# Patient Record
Sex: FEMALE | Race: BLACK OR AFRICAN AMERICAN | NOT HISPANIC OR LATINO | URBAN - METROPOLITAN AREA
[De-identification: names, ages, dates, MRNs, and addresses within clinical notes are randomized per-mention and may not be internally consistent; named-entity substitution may affect disease eponyms.]

---

## 2023-12-18 ENCOUNTER — OFFICE VISIT (OUTPATIENT)
Dept: PEDIATRICS | Facility: CLINIC | Age: 18
End: 2023-12-18
Payer: COMMERCIAL

## 2023-12-18 VITALS
SYSTOLIC BLOOD PRESSURE: 122 MMHG | BODY MASS INDEX: 34.48 KG/M2 | WEIGHT: 194.6 LBS | HEART RATE: 77 BPM | HEIGHT: 63 IN | DIASTOLIC BLOOD PRESSURE: 78 MMHG

## 2023-12-18 DIAGNOSIS — L21.9 SEBORRHEIC DERMATITIS: ICD-10-CM

## 2023-12-18 DIAGNOSIS — J45.20 MILD INTERMITTENT ASTHMA WITHOUT COMPLICATION (HHS-HCC): ICD-10-CM

## 2023-12-18 DIAGNOSIS — L85.3 DRY SKIN DERMATITIS: ICD-10-CM

## 2023-12-18 DIAGNOSIS — J30.2 SEASONAL ALLERGIC RHINITIS, UNSPECIFIED TRIGGER: ICD-10-CM

## 2023-12-18 DIAGNOSIS — Z00.129 ENCOUNTER FOR ROUTINE CHILD HEALTH EXAMINATION WITHOUT ABNORMAL FINDINGS: Primary | ICD-10-CM

## 2023-12-18 PROBLEM — J45.909 ASTHMA (HHS-HCC): Status: ACTIVE | Noted: 2023-12-18

## 2023-12-18 PROBLEM — F41.9 ANXIETY: Status: ACTIVE | Noted: 2023-12-18

## 2023-12-18 PROBLEM — H10.13 ALLERGIC CONJUNCTIVITIS OF BOTH EYES: Status: ACTIVE | Noted: 2023-12-18

## 2023-12-18 PROBLEM — E55.9 VITAMIN D DEFICIENCY: Status: ACTIVE | Noted: 2023-12-18

## 2023-12-18 PROBLEM — G43.909 MIGRAINE HEADACHE: Status: ACTIVE | Noted: 2023-12-18

## 2023-12-18 PROBLEM — N94.6 DYSMENORRHEA IN ADOLESCENT: Status: ACTIVE | Noted: 2023-12-18

## 2023-12-18 PROBLEM — L70.9 ACNE: Status: ACTIVE | Noted: 2019-08-15

## 2023-12-18 PROBLEM — J30.9 ALLERGIC RHINITIS: Status: ACTIVE | Noted: 2023-12-18

## 2023-12-18 PROCEDURE — 96127 BRIEF EMOTIONAL/BEHAV ASSMT: CPT | Performed by: PEDIATRICS

## 2023-12-18 PROCEDURE — 3008F BODY MASS INDEX DOCD: CPT | Performed by: PEDIATRICS

## 2023-12-18 PROCEDURE — 99395 PREV VISIT EST AGE 18-39: CPT | Performed by: PEDIATRICS

## 2023-12-18 RX ORDER — KETOCONAZOLE 20 MG/G
1 CREAM TOPICAL 2 TIMES DAILY
COMMUNITY
Start: 2019-07-31 | End: 2023-12-18 | Stop reason: SDUPTHER

## 2023-12-18 RX ORDER — ALBUTEROL SULFATE 90 UG/1
2 AEROSOL, METERED RESPIRATORY (INHALATION) EVERY 6 HOURS PRN
Qty: 18 G | Refills: 2 | Status: SHIPPED | OUTPATIENT
Start: 2023-12-18 | End: 2024-12-17

## 2023-12-18 RX ORDER — LORATADINE 10 MG/1
10 TABLET ORAL DAILY
COMMUNITY
End: 2023-12-18 | Stop reason: SDUPTHER

## 2023-12-18 RX ORDER — HYDROCORTISONE 25 MG/G
1 OINTMENT TOPICAL 2 TIMES DAILY
Qty: 28.35 G | Refills: 6 | Status: SHIPPED | OUTPATIENT
Start: 2023-12-18

## 2023-12-18 RX ORDER — CLINDAMYCIN PHOSPHATE 10 UG/ML
1 LOTION TOPICAL DAILY
COMMUNITY
Start: 2019-07-31

## 2023-12-18 RX ORDER — KETOCONAZOLE 20 MG/G
1 CREAM TOPICAL 2 TIMES DAILY
Qty: 60 G | Refills: 3 | Status: SHIPPED | OUTPATIENT
Start: 2023-12-18

## 2023-12-18 RX ORDER — HYDROCORTISONE 25 MG/G
1 OINTMENT TOPICAL 2 TIMES DAILY
COMMUNITY
Start: 2019-05-30 | End: 2023-12-18 | Stop reason: SDUPTHER

## 2023-12-18 RX ORDER — FLUTICASONE PROPIONATE 50 MCG
1 SPRAY, SUSPENSION (ML) NASAL DAILY
COMMUNITY
End: 2023-12-18 | Stop reason: SDUPTHER

## 2023-12-18 RX ORDER — ALBUTEROL SULFATE 0.83 MG/ML
2.5 SOLUTION RESPIRATORY (INHALATION) EVERY 4 HOURS PRN
Qty: 75 ML | Refills: 1 | Status: SHIPPED | OUTPATIENT
Start: 2023-12-18

## 2023-12-18 RX ORDER — ALBUTEROL SULFATE 0.83 MG/ML
2.5 SOLUTION RESPIRATORY (INHALATION) EVERY 4 HOURS PRN
COMMUNITY
End: 2023-12-18 | Stop reason: SDUPTHER

## 2023-12-18 RX ORDER — FLUTICASONE PROPIONATE 50 MCG
1 SPRAY, SUSPENSION (ML) NASAL DAILY
Qty: 16 G | Refills: 3 | Status: SHIPPED | OUTPATIENT
Start: 2023-12-18

## 2023-12-18 RX ORDER — KETOCONAZOLE 20 MG/ML
1 SHAMPOO, SUSPENSION TOPICAL 2 TIMES WEEKLY
COMMUNITY
End: 2023-12-18 | Stop reason: SDUPTHER

## 2023-12-18 RX ORDER — FLUTICASONE PROPIONATE 110 UG/1
2 AEROSOL, METERED RESPIRATORY (INHALATION)
COMMUNITY
Start: 2016-11-18 | End: 2023-12-18 | Stop reason: ALTCHOICE

## 2023-12-18 RX ORDER — LORATADINE 10 MG/1
10 TABLET ORAL DAILY
Qty: 30 TABLET | Refills: 11 | Status: SHIPPED | OUTPATIENT
Start: 2023-12-18

## 2023-12-18 RX ORDER — KETOCONAZOLE 20 MG/ML
1 SHAMPOO, SUSPENSION TOPICAL 2 TIMES WEEKLY
Qty: 120 ML | Refills: 6 | Status: SHIPPED | OUTPATIENT
Start: 2023-12-18

## 2023-12-18 RX ORDER — ALBUTEROL SULFATE 90 UG/1
1 AEROSOL, METERED RESPIRATORY (INHALATION) EVERY 4 HOURS PRN
COMMUNITY
Start: 2013-10-29 | End: 2023-12-18 | Stop reason: WASHOUT

## 2023-12-18 RX ORDER — ACETAMINOPHEN 500 MG
2000 TABLET ORAL DAILY
COMMUNITY

## 2023-12-18 NOTE — PROGRESS NOTES
"Subjective   Patient ID: Gayla Castellano is a 18 y.o. female who presents for Well Child (Here with self 18 yr old [facility]).  HPI      10+ year female checkup    Concerns: none        Diet and Nutrition: well balanced diet, appropriate Calcium intake - eats cheese and yogurt, most days of the week. All food groups. Normal appetite.   Sleep: No problems with sleep.   Elimination: normal bowel movement frequency, normal consistency, normal urine output  Dental: brushes teeth twice a day, established with dentist   Menstrual: has period about once a month, no abnormalities    School/Behavior:  ?  School/Behavior: [employer] - going well, political science major. Living there. Grades are good.   Exercise: gets regular exercise - dance and goes to gym with friends, physical activity level discussed and encouraged. [employer] , pretty involved in school  Social:   ? No Vaping, no smoking, no alcohol, no drugs  ? Not currently in relationship/dating. Not sexually active.   Depression screen: Denies problems with mood, normal depression screening          Visit Vitals  /78 (BP Location: Right arm, Patient Position: Sitting)   Pulse 77   Ht 1.593 m (5' 2.72\")   Wt 88.3 kg (194 lb 9.6 oz)   BMI 34.78 kg/m²   BSA 1.98 m²     Objective   Physical Exam  Vitals reviewed.   Constitutional:       General: She is not in acute distress.     Appearance: She is not toxic-appearing.   HENT:      Right Ear: Tympanic membrane and ear canal normal.      Left Ear: Tympanic membrane and ear canal normal.      Nose: Nose normal. No congestion or rhinorrhea.      Mouth/Throat:      Mouth: Mucous membranes are moist.      Pharynx: No oropharyngeal exudate or posterior oropharyngeal erythema.   Eyes:      General:         Right eye: No discharge.         Left eye: No discharge.      Extraocular Movements: Extraocular movements intact.      Pupils: Pupils are equal, round, and reactive to light.   Cardiovascular:      Rate and " Rhythm: Normal rate and regular rhythm.      Pulses: Normal pulses.      Heart sounds: Normal heart sounds. No murmur heard.  Pulmonary:      Effort: Pulmonary effort is normal.      Breath sounds: Normal breath sounds. No wheezing or rhonchi.   Abdominal:      Palpations: Abdomen is soft. There is no mass.      Tenderness: There is no abdominal tenderness.   Genitourinary:     Comments: Declines genital exam   Musculoskeletal:         General: No signs of injury.   Skin:     Findings: No rash.   Neurological:      Mental Status: She is alert.      Sensory: Sensation is intact.      Motor: Motor function is intact.      Gait: Gait is intact.   Psychiatric:         Mood and Affect: Mood normal.         NO - Family instructed to call __ days after going for test to obtain results  YES - OK for school and sports  NO - Family declined all or some vaccines  YES - All vaccines given at today's visit were reviewed with the family and patient. Risks/benefits/side effects discussed and VIS sheet provided. All questions answered. Given with consent    A/P:  Well child.  Depression Screen normal - PHQ-A score 7.  No hearing/vision   BMI reviewed - elevated, weight has increased from last year . Gets adequate exercise     Shots UTD, already received flu this year. Tdap next year     F/U:  1 year  Discussed all orders from visit and any results received today.    Assessment/Plan   {Assess/PlanSmartLinks:8101    1. Encounter for routine child health examination without abnormal findings    2. Mild intermittent asthma without complication    3. Seasonal allergic rhinitis, unspecified trigger    4. Seborrheic dermatitis    5. Dry skin dermatitis        Asthma - albuterol as needed. Not using flovent. Dance was trigger, URIs. No ER visits, albuterol helps     Allergies - meds help, fall is worst season     Refills for current medications     No problem-specific Assessment & Plan notes found for this encounter.      Problem List  Items Addressed This Visit       Allergic rhinitis    Relevant Medications    fluticasone (Flonase) 50 mcg/actuation nasal spray    loratadine (Claritin) 10 mg tablet    Seborrheic dermatitis    Relevant Medications    ketoconazole (NIZOral) 2 % shampoo    ketoconazole (NIZOral) 2 % cream    Asthma    Relevant Medications    albuterol 90 mcg/actuation inhaler    albuterol 2.5 mg /3 mL (0.083 %) nebulizer solution     Other Visit Diagnoses       Encounter for routine child health examination without abnormal findings    -  Primary    Dry skin dermatitis        Relevant Medications    hydrocortisone 2.5 % ointment

## 2023-12-29 RX ORDER — LORATADINE 10 MG/1
10 TABLET ORAL DAILY
Qty: 90 TABLET | OUTPATIENT
Start: 2023-12-29

## 2024-11-27 ENCOUNTER — APPOINTMENT (OUTPATIENT)
Dept: PRIMARY CARE | Facility: CLINIC | Age: 19
End: 2024-11-27
Payer: COMMERCIAL

## 2024-12-17 ENCOUNTER — APPOINTMENT (OUTPATIENT)
Dept: OBSTETRICS AND GYNECOLOGY | Facility: CLINIC | Age: 19
End: 2024-12-17
Payer: COMMERCIAL

## 2025-01-07 ENCOUNTER — APPOINTMENT (OUTPATIENT)
Dept: OBSTETRICS AND GYNECOLOGY | Facility: CLINIC | Age: 20
End: 2025-01-07
Payer: COMMERCIAL

## 2025-03-15 ENCOUNTER — OFFICE VISIT (OUTPATIENT)
Dept: URGENT CARE | Age: 20
End: 2025-03-15
Payer: COMMERCIAL

## 2025-03-15 VITALS
HEIGHT: 63 IN | WEIGHT: 169.97 LBS | SYSTOLIC BLOOD PRESSURE: 121 MMHG | TEMPERATURE: 97.9 F | OXYGEN SATURATION: 100 % | HEART RATE: 83 BPM | DIASTOLIC BLOOD PRESSURE: 72 MMHG | BODY MASS INDEX: 30.12 KG/M2

## 2025-03-15 DIAGNOSIS — B49 FUNGAL INFECTION: Primary | ICD-10-CM

## 2025-03-15 RX ORDER — KETOCONAZOLE 20 MG/G
CREAM TOPICAL 2 TIMES DAILY
Qty: 15 G | Refills: 0 | Status: SHIPPED | OUTPATIENT
Start: 2025-03-15 | End: 2025-03-29

## 2025-03-15 ASSESSMENT — ENCOUNTER SYMPTOMS
COLOR CHANGE: 1
LOSS OF SENSATION IN FEET: 0
DEPRESSION: 0
OCCASIONAL FEELINGS OF UNSTEADINESS: 0

## 2025-03-15 ASSESSMENT — PAIN SCALES - GENERAL: PAINLEVEL_OUTOF10: 0-NO PAIN

## 2025-03-15 NOTE — PATIENT INSTRUCTIONS
Ketoconazole cream please apply to area 2 times a day for 7 to 14 days.  If no improvement in 5 to 7 days, please reach out to dermatology.  Please call  referral line to schedule appointment.  Or  please call for a follow up appointment with:   [facility]  [address] [facility] [address]   [place] [address]  Phone: [phone]  Schedule appointment online: [facility]   Please call your primary care doctor next 5 to 7 days.  If worsening symptoms, please go local emergency department for further evaluation    Please follow up with your primary provider within one week if symptoms do not improve.  You may schedule an appointment online at [email or web address] or call [phone]. Go to the Emergency Department if symptoms significantly worsen or if you develop chest pain or shortness of breath.

## 2025-03-15 NOTE — PROGRESS NOTES
Subjective   Patient ID: Gayla Castellano is a 19 y.o. female. They present today with a chief complaint of fungle infection (Scalp/ neck infection x one week./Patient mentions having a hx of fungle infection to scaple. ).    History of Present Illness  Galya Castellano is a 19 y.o. female who presents to the clinic for 2 days of hyperpigmented patches of skin on the neck and the scalp.  Patient is brought in by patient's mother.  Patient mom states that they noticed 2 days ago.  Patient mom states patient is to have a history of fungal infection in the same area in the past.  Pt denies any chest pain, sob, N/V at this time in clinic.             Past Medical History  Allergies as of 03/15/2025    (No Known Allergies)       (Not in a hospital admission)       Past Medical History:   Diagnosis Date    Acute bronchitis, unspecified 03/03/2020    Acute purulent bronchitis    Acute bronchitis, unspecified 03/07/2018    Acute purulent bronchitis    Acute candidiasis of vulva and vagina 11/16/2016    Yeast vaginitis    Acute candidiasis of vulva and vagina 03/07/2018    Yeast vaginitis    COVID-19 03/10/2022    COVID-19 virus infection    Encounter for screening for diseases of the blood and blood-forming organs and certain disorders involving the immune mechanism 11/16/2016    Screening for iron deficiency anemia    Encounter for screening for lipoid disorders 11/16/2016    Screening for cholesterol level    Encounter for screening for other suspected endocrine disorder 11/16/2016    Screening for endocrine, nutritional, metabolic and immunity disorder    Moderate persistent asthma with (acute) exacerbation ([facility]) 03/07/2018    Moderate persistent asthma with acute exacerbation    Pain in left ankle and joints of left foot 11/16/2016    Acute left ankle pain    Pain in right ankle and joints of right foot 11/16/2016    Acute right ankle pain    Personal history of other diseases of the musculoskeletal system and  connective tissue 11/16/2016    History of low back pain    Personal history of other diseases of the respiratory system 06/27/2013    History of pharyngitis    Personal history of other diseases of the respiratory system 03/03/2020    History of acute sinusitis    Personal history of other diseases of the respiratory system     Personal history of asthma    Personal history of other diseases of the respiratory system 11/16/2016    History of acute sinusitis    Personal history of other diseases of the respiratory system 08/09/2018    History of asthma    Personal history of other diseases of the respiratory system 03/07/2018    History of acute pharyngitis    Personal history of other diseases of the respiratory system 03/07/2018    History of acute sinusitis    Personal history of other diseases of the respiratory system 01/21/2016    History of sinusitis    Personal history of other diseases of the respiratory system 01/21/2016    History of acute bronchitis    Personal history of other infectious and parasitic diseases 03/03/2020    History of candidiasis of vagina    Personal history of other specified conditions 11/16/2016    History of dysuria    Personal history of traumatic brain injury 03/08/2022    History of concussion    Pityriasis versicolor 01/03/2023    Tinea versicolor    Seborrhea capitis 10/20/2017    Dandruff       Past Surgical History:   Procedure Laterality Date    MR HEAD ANGIO WO IV CONTRAST  6/1/2020    MR HEAD ANGIO WO IV CONTRAST 6/1/2020 [facility]    OTHER SURGICAL HISTORY  10/28/2019    No history of surgery        reports that she has never smoked. She has never used smokeless tobacco. She reports that she does not drink alcohol and does not use drugs.    Review of Systems  Review of Systems   Skin:  Positive for color change and rash.        Back of neck, scalp   All other systems reviewed and are negative.                                 Objective    Vitals:    03/15/25  "1935   BP: 121/72   Pulse: 83   Temp: 36.6 °C (97.9 °F)   SpO2: 100%   Weight: 77.1 kg (169 lb 15.6 oz)   Height: 1.6 m (5' 3\")     No LMP recorded.    Physical Exam  Constitutional:       Appearance: Normal appearance.   Skin:         Neurological:      General: No focal deficit present.      Mental Status: She is alert and oriented to person, place, and time. Mental status is at baseline.   Psychiatric:         Mood and Affect: Mood normal.         Behavior: Behavior normal.         Procedures    Point of Care Test & Imaging Results from this visit  No results found for this visit on 03/15/25.   No results found.    Diagnostic study results (if any) were reviewed by JOEY Young.    Assessment/Plan   Allergies, medications, history, and pertinent labs/EKGs/Imaging reviewed by JOEY Young.     Medical Decision Making  Ketoconazole cream to be used to the areas of hyperpigmented skin.  Patient describes the areas as itching.  Patient has had history of fungal infections area in the past.  Advised patient if no improvement in 5 days to reach out to dermatology for evaluation.  Patient and mom verbalized understanding.    Orders and Diagnoses  Diagnoses and all orders for this visit:  Fungal infection  -     ketoconazole (NIZOral) 2 % cream; Apply topically 2 times a day for 14 days.  -     Referral to Dermatology      Medical Admin Record      Patient disposition: Home    Electronically signed by JOEY Young  8:08 PM      "

## 2025-05-20 ENCOUNTER — APPOINTMENT (OUTPATIENT)
Dept: OBSTETRICS AND GYNECOLOGY | Facility: CLINIC | Age: 20
End: 2025-05-20
Payer: COMMERCIAL

## 2025-05-20 VITALS — WEIGHT: 223 LBS | SYSTOLIC BLOOD PRESSURE: 120 MMHG | BODY MASS INDEX: 39.5 KG/M2 | DIASTOLIC BLOOD PRESSURE: 70 MMHG

## 2025-05-20 DIAGNOSIS — N94.6 DYSMENORRHEA: ICD-10-CM

## 2025-05-20 DIAGNOSIS — Z01.419 ENCOUNTER FOR ANNUAL ROUTINE GYNECOLOGICAL EXAMINATION: Primary | ICD-10-CM

## 2025-05-20 DIAGNOSIS — R19.00 PELVIC FULLNESS IN FEMALE: ICD-10-CM

## 2025-05-20 PROCEDURE — 99395 PREV VISIT EST AGE 18-39: CPT | Performed by: OBSTETRICS & GYNECOLOGY

## 2025-05-20 PROCEDURE — 87591 N.GONORRHOEAE DNA AMP PROB: CPT

## 2025-05-20 PROCEDURE — 87491 CHLMYD TRACH DNA AMP PROBE: CPT

## 2025-05-20 SDOH — ECONOMIC STABILITY: FOOD INSECURITY: WITHIN THE PAST 12 MONTHS, THE FOOD YOU BOUGHT JUST DIDN'T LAST AND YOU DIDN'T HAVE MONEY TO GET MORE.: NEVER TRUE

## 2025-05-20 SDOH — ECONOMIC STABILITY: TRANSPORTATION INSECURITY
IN THE PAST 12 MONTHS, HAS LACK OF TRANSPORTATION KEPT YOU FROM MEETINGS, WORK, OR FROM GETTING THINGS NEEDED FOR DAILY LIVING?: NO

## 2025-05-20 SDOH — ECONOMIC STABILITY: FOOD INSECURITY: WITHIN THE PAST 12 MONTHS, YOU WORRIED THAT YOUR FOOD WOULD RUN OUT BEFORE YOU GOT MONEY TO BUY MORE.: NEVER TRUE

## 2025-05-20 SDOH — ECONOMIC STABILITY: TRANSPORTATION INSECURITY
IN THE PAST 12 MONTHS, HAS THE LACK OF TRANSPORTATION KEPT YOU FROM MEDICAL APPOINTMENTS OR FROM GETTING MEDICATIONS?: NO

## 2025-05-20 ASSESSMENT — PATIENT HEALTH QUESTIONNAIRE - PHQ9
1. LITTLE INTEREST OR PLEASURE IN DOING THINGS: NOT AT ALL
SUM OF ALL RESPONSES TO PHQ9 QUESTIONS 1 & 2: 0
2. FEELING DOWN, DEPRESSED OR HOPELESS: NOT AT ALL

## 2025-05-20 ASSESSMENT — LIFESTYLE VARIABLES: HOW OFTEN DO YOU HAVE A DRINK CONTAINING ALCOHOL: NEVER

## 2025-05-20 ASSESSMENT — PAIN SCALES - GENERAL: PAINLEVEL_OUTOF10: 0-NO PAIN

## 2025-05-21 LAB
C TRACH RRNA SPEC QL NAA+PROBE: NEGATIVE
N GONORRHOEA DNA SPEC QL PROBE+SIG AMP: NEGATIVE

## 2025-05-23 ENCOUNTER — HOSPITAL ENCOUNTER (OUTPATIENT)
Dept: RADIOLOGY | Facility: HOSPITAL | Age: 20
Discharge: HOME | End: 2025-05-23
Payer: COMMERCIAL

## 2025-05-23 DIAGNOSIS — N94.6 DYSMENORRHEA: ICD-10-CM

## 2025-05-23 DIAGNOSIS — R19.00 PELVIC FULLNESS IN FEMALE: ICD-10-CM

## 2025-05-23 PROCEDURE — 76856 US EXAM PELVIC COMPLETE: CPT

## 2025-06-16 LAB
CYTOLOGY CMNT CVX/VAG CYTO-IMP: NORMAL
LAB AP HPV HR: NORMAL
LAB AP PAP ADDITIONAL TESTS: NORMAL
LABORATORY COMMENT REPORT: NORMAL
LMP START DATE: NORMAL
PATH REPORT.TOTAL CANCER: NORMAL

## 2025-07-08 ENCOUNTER — APPOINTMENT (OUTPATIENT)
Dept: PRIMARY CARE | Facility: CLINIC | Age: 20
End: 2025-07-08
Payer: COMMERCIAL

## 2025-07-31 ENCOUNTER — RESULTS FOLLOW-UP (OUTPATIENT)
Dept: OBSTETRICS AND GYNECOLOGY | Facility: CLINIC | Age: 20
End: 2025-07-31
Payer: COMMERCIAL

## 2025-08-01 ENCOUNTER — TELEPHONE (OUTPATIENT)
Dept: OBSTETRICS AND GYNECOLOGY | Facility: CLINIC | Age: 20
End: 2025-08-01
Payer: COMMERCIAL

## 2025-08-01 NOTE — TELEPHONE ENCOUNTER
Pt returned call.  Pt verified by name and .  Pt is aware per Dr. Soriano she needs to repeat her pap in one year.  Pt has no questions or concerns at this time.

## 2025-08-05 ASSESSMENT — DERMATOLOGY QUALITY OF LIFE (QOL) ASSESSMENT
RATE HOW EMOTIONALLY BOTHERED YOU ARE BY YOUR SKIN PROBLEM (FOR EXAMPLE, WORRY, EMBARRASSMENT, FRUSTRATION): 2
RATE HOW BOTHERED YOU ARE BY SYMPTOMS OF YOUR SKIN PROBLEM (EG, ITCHING, STINGING BURNING, HURTING OR SKIN IRRITATION): 3
RATE HOW BOTHERED YOU ARE BY EFFECTS OF YOUR SKIN PROBLEMS ON YOUR ACTIVITIES (EG, GOING OUT, ACCOMPLISHING WHAT YOU WANT, WORK ACTIVITIES OR YOUR RELATIONSHIPS WITH OTHERS): 1
RATE HOW BOTHERED YOU ARE BY EFFECTS OF YOUR SKIN PROBLEMS ON YOUR ACTIVITIES (EG, GOING OUT, ACCOMPLISHING WHAT YOU WANT, WORK ACTIVITIES OR YOUR RELATIONSHIPS WITH OTHERS): 1
WHAT SINGLE SKIN CONDITION LISTED BELOW IS THE PATIENT ANSWERING THE QUALITY-OF-LIFE ASSESSMENT QUESTIONS ABOUT: NONE OF THE ABOVE
DATE THE QUALITY-OF-LIFE ASSESSMENT WAS COMPLETED: 67423
RATE HOW EMOTIONALLY BOTHERED YOU ARE BY YOUR SKIN PROBLEM (FOR EXAMPLE, WORRY, EMBARRASSMENT, FRUSTRATION): 2
ARE THERE EXCLUSIONS OR EXCEPTIONS FOR THE QUALITY OF LIFE ASSESSMENT: NO
RATE HOW BOTHERED YOU ARE BY SYMPTOMS OF YOUR SKIN PROBLEM (EG, ITCHING, STINGING BURNING, HURTING OR SKIN IRRITATION): 3
WHAT SINGLE SKIN CONDITION LISTED BELOW IS THE PATIENT ANSWERING THE QUALITY-OF-LIFE ASSESSMENT QUESTIONS ABOUT: NONE OF THE ABOVE

## 2025-08-06 ENCOUNTER — APPOINTMENT (OUTPATIENT)
Dept: DERMATOLOGY | Facility: CLINIC | Age: 20
End: 2025-08-06
Payer: COMMERCIAL

## 2025-08-06 DIAGNOSIS — B36.0 TINEA VERSICOLOR: ICD-10-CM

## 2025-08-06 DIAGNOSIS — L21.9 SEBORRHEIC DERMATITIS: Primary | ICD-10-CM

## 2025-08-06 DIAGNOSIS — L29.9 PRURITUS: ICD-10-CM

## 2025-08-06 PROCEDURE — 99203 OFFICE O/P NEW LOW 30 MIN: CPT | Performed by: STUDENT IN AN ORGANIZED HEALTH CARE EDUCATION/TRAINING PROGRAM

## 2025-08-06 RX ORDER — FLUOCINOLONE ACETONIDE 0.11 MG/ML
OIL TOPICAL
Qty: 60 ML | Refills: 3 | Status: SHIPPED | OUTPATIENT
Start: 2025-08-06 | End: 2025-08-06

## 2025-08-06 RX ORDER — KETOCONAZOLE 20 MG/ML
SHAMPOO, SUSPENSION TOPICAL
Qty: 120 ML | Refills: 11 | Status: SHIPPED | OUTPATIENT
Start: 2025-08-10 | End: 2025-08-11

## 2025-08-06 RX ORDER — FLUOCINONIDE TOPICAL SOLUTION USP, 0.05% 0.5 MG/ML
SOLUTION TOPICAL
Qty: 60 ML | Refills: 3 | Status: SHIPPED | OUTPATIENT
Start: 2025-08-06

## 2025-09-08 ENCOUNTER — APPOINTMENT (OUTPATIENT)
Dept: PRIMARY CARE | Facility: CLINIC | Age: 20
End: 2025-09-08
Payer: COMMERCIAL

## 2026-05-26 ENCOUNTER — APPOINTMENT (OUTPATIENT)
Dept: OBSTETRICS AND GYNECOLOGY | Facility: CLINIC | Age: 21
End: 2026-05-26
Payer: COMMERCIAL